# Patient Record
Sex: MALE | Race: ASIAN | NOT HISPANIC OR LATINO | URBAN - METROPOLITAN AREA
[De-identification: names, ages, dates, MRNs, and addresses within clinical notes are randomized per-mention and may not be internally consistent; named-entity substitution may affect disease eponyms.]

---

## 2023-12-07 ENCOUNTER — EMERGENCY (EMERGENCY)
Facility: HOSPITAL | Age: 28
LOS: 1 days | Discharge: HOME | End: 2023-12-07
Admitting: EMERGENCY MEDICINE
Payer: COMMERCIAL

## 2023-12-07 VITALS
SYSTOLIC BLOOD PRESSURE: 111 MMHG | TEMPERATURE: 98 F | OXYGEN SATURATION: 96 % | HEART RATE: 81 BPM | RESPIRATION RATE: 20 BRPM | DIASTOLIC BLOOD PRESSURE: 60 MMHG

## 2023-12-07 VITALS
DIASTOLIC BLOOD PRESSURE: 66 MMHG | HEART RATE: 71 BPM | RESPIRATION RATE: 19 BRPM | OXYGEN SATURATION: 95 % | TEMPERATURE: 97 F | SYSTOLIC BLOOD PRESSURE: 105 MMHG

## 2023-12-07 PROCEDURE — 99283 EMERGENCY DEPT VISIT LOW MDM: CPT

## 2023-12-07 NOTE — ED ADULT NURSE NOTE - CHIEF COMPLAINT QUOTE
BIBEMS from [facility] for ams. admits to etoh, denies falls. +nausea. unsteady gait BIBEMS from [facility] for ams. admits to etoh, denies falls. +nausea. unsteady gait

## 2023-12-07 NOTE — ED ADULT NURSE NOTE - NSFALLUNIVINTERV_ED_ALL_ED
Bed/Stretcher in lowest position, wheels locked, appropriate side rails in place/Call bell, personal items and telephone in reach/Instruct patient to call for assistance before getting out of bed/chair/stretcher/Non-slip footwear applied when patient is off stretcher/[place] to call system/Physically safe environment - no spills, clutter or unnecessary equipment/Purposeful proactive rounding/Room/bathroom lighting operational, light cord in reach Bed/Stretcher in lowest position, wheels locked, appropriate side rails in place/Call bell, personal items and telephone in reach/Instruct patient to call for assistance before getting out of bed/chair/stretcher/Non-slip footwear applied when patient is off stretcher/[place] to call system/Physically safe environment - no spills, clutter or unnecessary equipment/Purposeful proactive rounding/Room/bathroom lighting operational, light cord in reach

## 2023-12-07 NOTE — ED PROVIDER NOTE - PATIENT PORTAL LINK FT
You can access the FollowMyHealth Patient Portal offered by [facility] by registering at the following website: [email or web address]. By joining [employer]’s FollowMyHealth portal, you will also be able to view your health information using other applications (apps) compatible with our system. You can access the FollowMyHealth Patient Portal offered by [facility] by registering at the following website: [email or web address] By joining [employer]’s FollowMyHealth portal, you will also be able to view your health information using other applications (apps) compatible with our system.

## 2023-12-07 NOTE — ED PROVIDER NOTE - OBJECTIVE STATEMENT
29 yo M with no known PMHx, BIBA for AMS. Admits to having heavy EtOH intake today and had an episode of NBNB emesis en route to the ED.  Denies drug use or other illicits. Denies trauma, fall, HA, dizziness, bleeding, D/C, CP, SOB, palpitations, tremors, change in urinary/bowel function, and abdominal pain. No illicit drug use noted. 27 yo M with no known PMHx, BIBA for AMS. Admits to having heavy EtOH intake today and had an episode of NBNB emesis en route to the ED.  Denies drug use or other illicits. Denies trauma, fall, HA, dizziness, bleeding, D/C, CP, SOB, palpitations, tremors, change in urinary/bowel function, and abdominal pain. No illicit drug use noted.

## 2023-12-07 NOTE — ED PROVIDER NOTE - PHYSICAL EXAMINATION
Gen - WDWN, +AOB, no acute distress  Skin - warm, dry, intact  HEENT - AT/NC, PERRL, mild conjunctival injection, pupils 4mm b/l, TM intact with no hemotympanium b/l, no facial contusion or periorbital ecchymosis, o/p clear, uvula midline, airway patent, neck supple with no step off or midline tenderness, FROM   CV - S1S2, R/R/R  Resp - respiration non-labored, CTAB, symmetric bs b/l, no r/r/w  GI - NABS, soft, ND, NT, no rebound or guarding, no CVAT b/l  MS - w/w/p, no c/c/e, calves supple and NT  Neuro - Alert and awake and oriented, slightly slurred speech, ambulatory with unsteady gait, no focal deficits

## 2023-12-07 NOTE — ED ADULT TRIAGE NOTE - CHIEF COMPLAINT QUOTE
BIBEMS from [facility] for ams. admits to etoh, denies falls. +nausea. unsteady gait BIBEMS from [facility] for ams. admits to etoh, denies falls. +nausea. unsteady gait

## 2023-12-07 NOTE — ED ADULT NURSE NOTE - PATIENT'S SEXUAL ORIENTATION
Your BMI is Body mass index is 32.79 kg/m .  Weight management is a personal decision.  If you are interested in exploring weight loss strategies, the following discussion covers the approaches that may be successful. Body mass index (BMI) is one way to tell whether you are at a healthy weight, overweight, or obese. It measures your weight in relation to your height.  A BMI of 18.5 to 24.9 is in the healthy range. A person with a BMI of 25 to 29.9 is considered overweight, and someone with a BMI of 30 or greater is considered obese. More than two-thirds of American adults are considered overweight or obese.  Being overweight or obese increases the risk for further weight gain. Excess weight may lead to heart disease and diabetes.  Creating and following plans for healthy eating and physical activity may help you improve your health.  Weight control is part of healthy lifestyle and includes exercise, emotional health, and healthy eating habits. Careful eating habits lifelong are the mainstay of weight control. Though there are significant health benefits from weight loss, long-term weight loss with diet alone may be very difficult to achieve- studies show long-term success with dietary management in less than 10% of people. Attaining a healthy weight may be especially difficult to achieve in those with severe obesity. In some cases, medications, devices and surgical management might be considered.  What can you do?  If you are overweight or obese and are interested in methods for weight loss, you should discuss this with your provider.     Consider reducing daily calorie intake by 500 calories.     Keep a food journal.     Avoiding skipping meals, consider cutting portions instead.    Diet combined with exercise helps maintain muscle while optimizing fat loss. Strength training is particularly important for building and maintaining muscle mass. Exercise helps reduce stress, increase energy, and improves fitness.  Increasing exercise without diet control, however, may not burn enough calories to loose weight.       Start walking three days a week 10-20 minutes at a time    Work towards walking thirty minutes five days a week     Eventually, increase the speed of your walking for 1-2 minutes at time    In addition, we recommend that you review healthy lifestyles and methods for weight loss available through the National Institutes of Health patient information sites:  http://win.niddk.nih.gov/publications/index.htm    And look into health and wellness programs that may be available through your health insurance provider, employer, local community center, or [employer]i club.    Weight management plan: Patient was referred to their PCP to discuss a diet and exercise plan.       Unknown

## 2023-12-07 NOTE — ED PROVIDER NOTE - CARE PLAN
1 Principal Discharge DX:	Altered mental status   Principal Discharge DX:	Altered mental status  Secondary Diagnosis:	Alcoholic intoxication

## 2023-12-09 DIAGNOSIS — R41.82 ALTERED MENTAL STATUS, UNSPECIFIED: ICD-10-CM

## 2023-12-09 DIAGNOSIS — F10.129 ALCOHOL ABUSE WITH INTOXICATION, UNSPECIFIED: ICD-10-CM

## 2025-05-21 NOTE — ED PROVIDER NOTE - IV ALTEPLASE EXCL REL HIDDEN
This patient was seen at the request of the attending psychiatrist, Haley Lindquist, * for history and physical medical consultation clearance.    History and Physical    Patient: Brian Callahan  Date of Service: 2025    :     2000  PCP: Cecil Mahoney MD       Subjective:     Chief Complaint: Depression and anxiety    HPI: Patient is admitted for his problem with anxiety and depression.  Denies any physical issues except has GERD and takes Protonix sometimes.    Past Medical History:   Diagnosis Date    Anxiety and depression     Essential (primary) hypertension     GERD (gastroesophageal reflux disease)     Motion sickness     States he has been getting dizzy spells over the past year.    Sinusitis, chronic        Past Surgical History:   Procedure Laterality Date    No past surgeries      [place] tooth extraction          Prior to Admission medications    Medication Sig Start Date End Date Taking? Authorizing Provider   emtricitabine-tenofovir DISOPROXIL (TRUVADA) 200-300 MG per tablet Take 1 tablet by mouth daily. 25   Cecil Mahoney MD   escitalopram (LEXAPRO) 10 MG tablet Take 1 tablet by mouth nightly. 25   Cecil Mahoney MD   escitalopram (LEXAPRO) 20 MG tablet Take 1 tablet by mouth daily. Begin taking on 2025. 3/20/25   Cecil Mahoney MD   ketoconazole (NIZORAL) 2 % shampoo TWICE A WEEK: apply 5 to 10 mL to wet scalp, lather, leave on 3 to 5 minutes, and rinse. 10/29/24   Cecil Mahoney MD   diclofenac (VOLTAREN) 1 % gel Apply 4 g topically 4 times daily as needed (pain). 10/29/24   Cecil Mahoney MD   hydrOXYzine (ATARAX) 50 MG tablet Take 1 tablet by mouth every 8 hours as needed for Anxiety (sleep). 24  Checo Umana MD   Valacyclovir HCl 1000 MG Tab Take 1 tablet by mouth daily. 24   Cecil Mahoney MD   pantoprazole (PROTONIX) 40 MG tablet Take 1 tablet by mouth daily. 24   Cecil Mahoney MD            ALLERGIES:  No Known Allergies    Family History   Problem Relation Age of Onset    Other Mother         depression, IBD and arthritis, retinal artery occlusion    Other Maternal Grandmother         depression     Diabetes Maternal Grandmother     Other Maternal Grandfather         bipolar    Diabetes Maternal Grandfather         Social History     Tobacco Use    Smoking status: Former     Current packs/day: 0.00     Types: Cigarettes     Quit date: 10/26/2018     Years since quittin.5    Smokeless tobacco: Former   Vaping Use    Vaping status: Former   Substance Use Topics    Alcohol use: Yes     Comment: \"every few weeks\", \"maybe like a beer a month\"    Drug use: No     Comment: past per pt. 3/7/18       Review of Systems  CONSTITUTIONAL: Denies unintentional weight change, fatigue, fever, problematic headaches.  EYES:  Denies visual blurring, double vision.  ENT: Denies chronic sinus or nasal problems,dysphagia.    CV:  Denies chest discomfort with exertion, SOB, palpitations.  RESPIRATORY: Denies cough, SOB.  GI:  Denies abdominal pain, frequent nausea, vomiting, diarrhea, constipation, hematemesis, hematochezia, melena.  : Denies frequency, dysuria.  MSK: Denies joint pain, muscle aches.  NEURO:  Denies muscle weakness or paralysis, numbness or tingling, seizures.  PSYCH: See HPI above.  ENDOCRINE:  Denies polyphagia, polydipsia, polyuria.  HEME/LYMPH:  Denies abnormal bruising or bleeding, lumps or bumps.  ALLERGY/IMMUN: Denies chronic sinus problems, chronic nasal problems.    All other systems reviewed and negative.      Objective:     Visit Vitals  BP (!) 130/90 (BP Location: LUE - Left upper extremity, Patient Position: Standing) Comment: JAYLEN De Leon notified   Pulse (!) 116   Temp 98.3 °F (36.8 °C) (Oral)   SpO2 97%       General Appearance: Well developed male. Alert, cooperative, no distress, appears stated age.  HEENT: Normocephalic, without obvious abnormality. PERRL, no icterus,  EOM's intact. Hearing appears intact. No nasal discharge.  Neck: Supple, symmetrical, trachea midline, no adenopathy. Thyroid: no enlargement/tenderness/nodules.  Back: ROM normal, no CVA tenderness.  Lungs: Clear to auscultation bilaterally, respirations unlabored.  Heart: Regular rate and rhythm, S1 and S2 normal, no murmur, rub or gallop.  Abdomen: Soft, non-tender, bowel sounds active, no masses, no organomegaly.  Extremities: Extremities normal, atraumatic, no cyanosis. No stiffness, swelling.   Skin: Skin color, texture, turgor normal, no rashes or lesions.  Lymph nodes: Cervical nodes normal.  Genitorectal: Deferred.  Neurologic: CRANIAL NERVE:  Alert and oriented x3.  No gross deficit of sensory or cranial nerves II through XII:  II: Acuity and visual fields are normal.  III, IV, VI:  External ocular movements are normal, and there is no presence of any nystagmus or ptosis.  Pupils are of equal size, regularity, and react equally to the light and accommodation.  No evidence of divergent or convergent strabismus or diplopia when looking down or to either side.  V:   Facial sensation is intact and equal.  There is no deviation of the jaw or weakness of masseter or temporal muscles.  VII: Facial expression, nasolabial folds, forehead wrinkle are normal.  VIII: Hearing is intact.  There is no evidence of nystagmus and cerebellar function is intact.  IX:  Normal gag reflex.  X:   No evidence of deviation of the soft palate or laryngeal paralysis.    XI:  Shrug of shoulders is equal and strong.  XII: No deviation of the protruded tongue.  No evidence of atrophy or fine fibrillation.  Finger-nose test is normal.  Gait is normal. Rhomberg sign is negative.    Data Review:      CBC  No results found    CMP  No results found    Lipid Panel  No results found    Urinalysis  No results found    Other  No results found     Assessment:     Mood disorder  GERD  Elevated blood pressure without history of hypertension,  observe with low-salt diet    Plan:     Patient is medically stable and appropriate for partial hospital program.  Thank you Dr. Haley GRIJALVA  Patient is cleared for treatment here, and advised to follow up with primary care provider for medical concerns.    Jeffrey Candelario MD  5/21/2025   show